# Patient Record
(demographics unavailable — no encounter records)

---

## 2025-01-22 NOTE — ADDENDUM
[FreeTextEntry1] : This note was written by Romana Desir on 01/22/2025 acting as scribe for Dr. Wilmer Locke M.D.  I, Wilmer Locke MD, have read and attest that all the information, medical decision making and discharge instructions within are true and accurate.

## 2025-01-22 NOTE — DISCUSSION/SUMMARY
[de-identified] : L5-S1 Lumbar degenerative disc disease Lumbar strain and sprain Discussed all options. Diclofenac PRN. Referral for physical therapy. If no better 3-4 weeks, lumbar MRI. All options discussed including rest, medicine, home exercise, acupuncture, Chiropractic care, Physical Therapy, Pain management, and last resort surgery. All questions were answered, all alternatives discussed and the patient is in complete agreement with the treatment plan which the patient contributed to and discussed with me through the shared decision-making process. Follow-up appointment as instructed. Any issues and the patient will call or come in sooner. Follow-up appointment as instructed. Any issues and the patient will call or come in sooner.

## 2025-01-22 NOTE — PHYSICAL EXAM
[Normal] : Gait: normal [Lott's Sign] : negative Lott's sign [Pronator Drift] : negative pronator drift [SLR] : negative straight leg raise [de-identified] : 5 out of 5 motor strength, sensation is intact and symmetrical full range of motion flexion extension and rotation, no palpatory tenderness full range of motion of hips knees shoulders and elbows (all four extremities), no atrophy, negative straight leg raise, no pathological reflexes, no swelling, normal ambulation, no apparent distress skin is intact, no palpable lymph nodes, no upper or lower extremity instability, alert and oriented x3 and normal mood. Normal finger-to nose test. [de-identified] : XR AP Lat Lumbar 11/24/2024-L5-S1 Lumbar degenerative disc disease, transitional segment, limbus bone-reviewed with patient.

## 2025-01-22 NOTE — HISTORY OF PRESENT ILLNESS
[Stable] : stable [de-identified] : 43 year old female patient presents for an initial evaluation of chronic lower back pain, with recent exacerbation since October 2024.  She was in a gym class and notes she may had hurt her back then.  Denies radiation of pain down the legs. Denies numbness/tingling. Denies weakness. Standing aggravates the pain. She had tried meloxicam and muscle relaxants which helped.  Has not tried PT or chiropractic care. Denies PK. She notes that her pain has improved. PMHx: denies significant medical history  She is a . She also plays tennis once a week.  No fever chills sweats nausea vomiting no bowel or bladder dysfunction, no recent weight loss or gain no night pain.